# Patient Record
Sex: FEMALE | Race: WHITE | NOT HISPANIC OR LATINO
[De-identification: names, ages, dates, MRNs, and addresses within clinical notes are randomized per-mention and may not be internally consistent; named-entity substitution may affect disease eponyms.]

---

## 2017-01-10 ENCOUNTER — APPOINTMENT (OUTPATIENT)
Dept: INTERNAL MEDICINE | Facility: CLINIC | Age: 77
End: 2017-01-10

## 2020-02-05 ENCOUNTER — APPOINTMENT (OUTPATIENT)
Dept: ORTHOPEDIC SURGERY | Facility: CLINIC | Age: 80
End: 2020-02-05

## 2020-02-10 ENCOUNTER — APPOINTMENT (OUTPATIENT)
Dept: ORTHOPEDIC SURGERY | Facility: CLINIC | Age: 80
End: 2020-02-10
Payer: MEDICARE

## 2020-02-10 VITALS
DIASTOLIC BLOOD PRESSURE: 80 MMHG | SYSTOLIC BLOOD PRESSURE: 136 MMHG | HEIGHT: 60 IN | BODY MASS INDEX: 32.2 KG/M2 | WEIGHT: 164 LBS | HEART RATE: 82 BPM

## 2020-02-10 PROCEDURE — 99204 OFFICE O/P NEW MOD 45 MIN: CPT | Mod: 25

## 2020-02-10 PROCEDURE — 20610 DRAIN/INJ JOINT/BURSA W/O US: CPT | Mod: RT

## 2020-02-10 PROCEDURE — 73564 X-RAY EXAM KNEE 4 OR MORE: CPT | Mod: 50

## 2020-02-10 RX ORDER — METHYLPRED ACET/NACL,ISO-OS/PF 40 MG/ML
40 VIAL (ML) INJECTION
Qty: 1 | Refills: 0 | Status: COMPLETED | OUTPATIENT
Start: 2020-02-10

## 2020-02-10 RX ORDER — LIDOCAINE HYDROCHLORIDE 10 MG/ML
1 INJECTION, SOLUTION INFILTRATION; PERINEURAL
Refills: 0 | Status: COMPLETED | OUTPATIENT
Start: 2020-02-10

## 2020-02-10 RX ADMIN — METHYLPREDNISOLONE ACETATE 2 MG/ML: 40 INJECTION, SUSPENSION INTRALESIONAL; INTRAMUSCULAR; INTRASYNOVIAL; SOFT TISSUE at 00:00

## 2020-02-10 RX ADMIN — LIDOCAINE HYDROCHLORIDE 3 %: 10 INJECTION, SOLUTION INFILTRATION; PERINEURAL at 00:00

## 2020-03-02 ENCOUNTER — APPOINTMENT (OUTPATIENT)
Dept: ORTHOPEDIC SURGERY | Facility: CLINIC | Age: 80
End: 2020-03-02
Payer: MEDICARE

## 2020-03-02 VITALS — HEART RATE: 85 BPM | SYSTOLIC BLOOD PRESSURE: 131 MMHG | DIASTOLIC BLOOD PRESSURE: 79 MMHG

## 2020-03-02 PROCEDURE — 99214 OFFICE O/P EST MOD 30 MIN: CPT

## 2021-06-11 ENCOUNTER — NON-APPOINTMENT (OUTPATIENT)
Age: 81
End: 2021-06-11

## 2021-06-14 ENCOUNTER — APPOINTMENT (OUTPATIENT)
Dept: INTERNAL MEDICINE | Facility: CLINIC | Age: 81
End: 2021-06-14
Payer: MEDICARE

## 2021-06-14 VITALS
HEIGHT: 58.5 IN | WEIGHT: 172 LBS | BODY MASS INDEX: 35.14 KG/M2 | DIASTOLIC BLOOD PRESSURE: 80 MMHG | SYSTOLIC BLOOD PRESSURE: 162 MMHG | OXYGEN SATURATION: 96 % | HEART RATE: 85 BPM

## 2021-06-14 VITALS — SYSTOLIC BLOOD PRESSURE: 145 MMHG | DIASTOLIC BLOOD PRESSURE: 70 MMHG

## 2021-06-14 DIAGNOSIS — C44.90 UNSPECIFIED MALIGNANT NEOPLASM OF SKIN, UNSPECIFIED: ICD-10-CM

## 2021-06-14 DIAGNOSIS — I10 ESSENTIAL (PRIMARY) HYPERTENSION: ICD-10-CM

## 2021-06-14 PROCEDURE — G0442 ANNUAL ALCOHOL SCREEN 15 MIN: CPT

## 2021-06-14 PROCEDURE — G0439: CPT

## 2021-06-14 PROCEDURE — 99072 ADDL SUPL MATRL&STAF TM PHE: CPT

## 2021-06-14 PROCEDURE — G0444 DEPRESSION SCREEN ANNUAL: CPT | Mod: 59

## 2021-06-14 NOTE — REVIEW OF SYSTEMS
[Negative] : Heme/Lymph [Fever] : no fever [Chills] : no chills [Chest Pain] : no chest pain [Shortness Of Breath] : no shortness of breath [Abdominal Pain] : no abdominal pain [Joint Pain] : joint pain [Muscle Pain] : muscle pain [FreeTextEntry9] : right arm pain, knee pain

## 2021-06-14 NOTE — ASSESSMENT
[FreeTextEntry1] : 81F with HTN, GERD, hx of skin cancer, osteoarthritis of b/l knees, hx of herpes zoster infection and post herpetic neuralgia presents for visit to establish care with new provider. \par \par 1. HTN\par -not controlled but patient has not taken medicatoin\par -will check at home and RTC if uncontrolled\par \par 2. GERD\par -controlled with nexium\par \par 3. Skin Cancer\par -dermatology follow up - referral placed\par -counselled on sunscreen use\par \par 4. HCM\par -DEXA - referral given today\par -pneumovax - latasha remind next visit\par -shingrix - pt not wanting vaccines this visit\par -tdap  -discuss next visit\par -COVID vaccine - pfizer completed in april\par \par 5. Right tricep strain\par -normal strength and ROM in bilateral upper extremities\par -pain improved with diclofenac\par -PT referral\par \par rtc 3 months

## 2021-06-14 NOTE — HEALTH RISK ASSESSMENT
[Good] : ~his/her~  mood as  good [No] : In the past 12 months have you used drugs other than those required for medical reasons? No [None] : None [With Family] : lives with family [Fully functional (bathing, dressing, toileting, transferring, walking, feeding)] : Fully functional (bathing, dressing, toileting, transferring, walking, feeding) [Feels Safe at Home] : Feels safe at home [Fully functional (using the telephone, shopping, preparing meals, housekeeping, doing laundry, using] : Fully functional and needs no help or supervision to perform IADLs (using the telephone, shopping, preparing meals, housekeeping, doing laundry, using transportation, managing medications and managing finances) [Carbon Monoxide Detector] : carbon monoxide detector [Smoke Detector] : smoke detector [Seat Belt] :  uses seat belt [Sunscreen] : uses sunscreen [] : No [0] : 2) Feeling down, depressed, or hopeless: Not at all (0) [Audit-CScore] : 0 [de-identified] : exercising at home - yessica [de-identified] : reducing rice intake; loves vegetables [IOL3Iyqsy] : 0 [Patient declined mammogram] : Patient declined mammogram [Patient declined colonoscopy] : Patient declined colonoscopy [HIV Test offered] : HIV Test offered [Hepatitis C test offered] : Hepatitis C test offered [Retired] : retired [Sexually Active] : not sexually active [Reports changes in hearing] : Reports no changes in hearing [Reports changes in vision] : Reports no changes in vision [Reports changes in dental health] : Reports no changes in dental health [Guns at Home] : no guns at home [de-identified] : grandson, son, daughter in law [FreeTextEntry3] : reports not  for past 40 years

## 2021-06-14 NOTE — HISTORY OF PRESENT ILLNESS
[Ad Hoc ] : provided by an ad hoc  [FreeTextEntry1] : Visit to establish care with new primary care doctor\par  [FreeTextEntry4] : Aviva Buck  [FreeTextEntry3] : P [TWNoteComboBox1] : Polish [de-identified] : Pleasant 81F originally from [facility] with HTN, GERD, hx of skin cancer, osteoarthritis of b/l knees recommended for right TKR, hx of herpes zoster infection and post herpetic neuralgia presents for visit to establish care with new provider. She is accompanied by her grandson today and prefers that he perform Lithuanian translation - Heber AVINA Patient history/interview somewhat limited by pt enthusiastically answering questions before they are translated even after Heber and I try to phrase/pose questions. Pt just came back from [facility] with family, in a great mood\par \par Reviewed notes from Dr. Odom 5/5/12 and Dr. Arizmendi 3/2/20. \par \par Pt having right arm pain x 1 month, using hot/cold compress with no change in her symptoms. Pain worse at night. No trauma. \par \par Medications: lisinopril 5mg, amlodipine 5 mg, meloxicam 15 mg, Nexium, 40 mg, diclofenac 50 mg daily, gabapentin 300 mg daily - pt reporting diclofenac working better than meloxicam; stop meloxicam. Take nsaid with food, pt reports taking once a day or less\par Allergies: allergic to some kind of stomach medication; not sure.

## 2021-06-14 NOTE — PHYSICAL EXAM
[Normal Voice/Communication] : normal voice/communication [Normal TMs] : both tympanic membranes were normal [No Carotid Bruits] : no carotid bruits [No Abdominal Bruit] : a ~M bruit was not heard ~T in the abdomen [No Varicosities] : no varicosities [No Edema] : there was no peripheral edema [No Palpable Aorta] : no palpable aorta [No Extremity Clubbing/Cyanosis] : no extremity clubbing/cyanosis [Normal Appearance] : normal in appearance [No Masses] : no palpable masses [No Nipple Discharge] : no nipple discharge [No Axillary Lymphadenopathy] : no axillary lymphadenopathy [Normal Supraclavicular Nodes] : no supraclavicular lymphadenopathy [Coordination Grossly Intact] : coordination grossly intact [No Focal Deficits] : no focal deficits [Normal Gait] : normal gait [Normal] : affect was normal and insight and judgment were intact [de-identified] : mild pain on right side triceps flexion - normal strength of bilateral deltoid, triceps, bicep,  strength all 5/5

## 2021-06-15 LAB
ALBUMIN SERPL ELPH-MCNC: 4.7 G/DL
ALP BLD-CCNC: 92 U/L
ALT SERPL-CCNC: 13 U/L
ANION GAP SERPL CALC-SCNC: 12 MMOL/L
AST SERPL-CCNC: 19 U/L
BASOPHILS # BLD AUTO: 0.03 K/UL
BASOPHILS NFR BLD AUTO: 0.3 %
BILIRUB SERPL-MCNC: 0.4 MG/DL
BUN SERPL-MCNC: 15 MG/DL
CALCIUM SERPL-MCNC: 9.9 MG/DL
CHLORIDE SERPL-SCNC: 101 MMOL/L
CHOLEST SERPL-MCNC: 261 MG/DL
CO2 SERPL-SCNC: 28 MMOL/L
CREAT SERPL-MCNC: 0.8 MG/DL
EOSINOPHIL # BLD AUTO: 0.2 K/UL
EOSINOPHIL NFR BLD AUTO: 2.1 %
ESTIMATED AVERAGE GLUCOSE: 128 MG/DL
GLUCOSE SERPL-MCNC: 116 MG/DL
HBA1C MFR BLD HPLC: 6.1 %
HCT VFR BLD CALC: 44 %
HCV AB SER QL: NONREACTIVE
HCV S/CO RATIO: 0.16 S/CO
HDLC SERPL-MCNC: 99 MG/DL
HGB BLD-MCNC: 14.4 G/DL
HIV1+2 AB SPEC QL IA.RAPID: NONREACTIVE
IMM GRANULOCYTES NFR BLD AUTO: 0.3 %
LDLC SERPL CALC-MCNC: 140 MG/DL
LYMPHOCYTES # BLD AUTO: 2.36 K/UL
LYMPHOCYTES NFR BLD AUTO: 24.9 %
MAN DIFF?: NORMAL
MCHC RBC-ENTMCNC: 31.6 PG
MCHC RBC-ENTMCNC: 32.7 GM/DL
MCV RBC AUTO: 96.5 FL
MONOCYTES # BLD AUTO: 0.91 K/UL
MONOCYTES NFR BLD AUTO: 9.6 %
NEUTROPHILS # BLD AUTO: 5.94 K/UL
NEUTROPHILS NFR BLD AUTO: 62.8 %
NONHDLC SERPL-MCNC: 162 MG/DL
PLATELET # BLD AUTO: 209 K/UL
POTASSIUM SERPL-SCNC: 4.3 MMOL/L
PROT SERPL-MCNC: 7.4 G/DL
RBC # BLD: 4.56 M/UL
RBC # FLD: 13 %
SODIUM SERPL-SCNC: 141 MMOL/L
TRIGL SERPL-MCNC: 110 MG/DL
WBC # FLD AUTO: 9.47 K/UL

## 2021-08-16 ENCOUNTER — LABORATORY RESULT (OUTPATIENT)
Age: 81
End: 2021-08-16

## 2021-08-16 ENCOUNTER — APPOINTMENT (OUTPATIENT)
Dept: DERMATOLOGY | Facility: CLINIC | Age: 81
End: 2021-08-16
Payer: MEDICARE

## 2021-08-16 ENCOUNTER — APPOINTMENT (OUTPATIENT)
Dept: RADIOLOGY | Facility: CLINIC | Age: 81
End: 2021-08-16
Payer: MEDICARE

## 2021-08-16 ENCOUNTER — RESULT REVIEW (OUTPATIENT)
Age: 81
End: 2021-08-16

## 2021-08-16 VITALS — BODY MASS INDEX: 36.11 KG/M2 | WEIGHT: 172 LBS | HEIGHT: 58 IN

## 2021-08-16 DIAGNOSIS — L82.1 OTHER SEBORRHEIC KERATOSIS: ICD-10-CM

## 2021-08-16 PROCEDURE — 99202 OFFICE O/P NEW SF 15 MIN: CPT | Mod: 25

## 2021-08-16 PROCEDURE — 11102 TANGNTL BX SKIN SINGLE LES: CPT

## 2021-08-16 PROCEDURE — 77080 DXA BONE DENSITY AXIAL: CPT

## 2021-08-23 ENCOUNTER — NON-APPOINTMENT (OUTPATIENT)
Age: 81
End: 2021-08-23

## 2021-09-15 ENCOUNTER — APPOINTMENT (OUTPATIENT)
Dept: DERMATOLOGY | Facility: CLINIC | Age: 81
End: 2021-09-15

## 2021-09-27 ENCOUNTER — NON-APPOINTMENT (OUTPATIENT)
Age: 81
End: 2021-09-27

## 2021-10-06 ENCOUNTER — APPOINTMENT (OUTPATIENT)
Dept: INTERNAL MEDICINE | Facility: CLINIC | Age: 81
End: 2021-10-06

## 2021-12-22 ENCOUNTER — APPOINTMENT (OUTPATIENT)
Dept: INTERNAL MEDICINE | Facility: CLINIC | Age: 81
End: 2021-12-22

## 2022-02-10 ENCOUNTER — NON-APPOINTMENT (OUTPATIENT)
Age: 82
End: 2022-02-10

## 2022-04-11 ENCOUNTER — APPOINTMENT (OUTPATIENT)
Dept: INTERNAL MEDICINE | Facility: CLINIC | Age: 82
End: 2022-04-11

## 2022-04-18 ENCOUNTER — APPOINTMENT (OUTPATIENT)
Dept: INTERNAL MEDICINE | Facility: CLINIC | Age: 82
End: 2022-04-18
Payer: MEDICARE

## 2022-04-18 VITALS
WEIGHT: 172 LBS | HEART RATE: 74 BPM | OXYGEN SATURATION: 98 % | HEIGHT: 58 IN | DIASTOLIC BLOOD PRESSURE: 80 MMHG | BODY MASS INDEX: 36.11 KG/M2 | SYSTOLIC BLOOD PRESSURE: 160 MMHG

## 2022-04-18 DIAGNOSIS — B02.29 OTHER POSTHERPETIC NERVOUS SYSTEM INVOLVEMENT: ICD-10-CM

## 2022-04-18 PROCEDURE — 99214 OFFICE O/P EST MOD 30 MIN: CPT

## 2022-04-18 NOTE — HISTORY OF PRESENT ILLNESS
[Ad Hoc ] : provided by an ad hoc  [FreeTextEntry8] : 81F originally from [facility] with HTN, GERD, hx of skin cancer, osteoarthritis of b/l knees recommended for right TKR presents for walk in acute visit with complaints of :follow up of HTN\par Would like non generic lisinopril sent to pharmacy\par Does not check her blood pressures at home - does not have cuff to check with\par Would like nexium refilled\par Would like GI referral for endoscopy - has appt this week\par Would like all medications renewed\par Would like to discuss osteopenia results from DEXA [Family Member] : family member [Interpreters_FullName] : Heber [Interpreters_Relationshiptopatient] : Son [TWNoteComboBox1] : [place]

## 2022-04-18 NOTE — PHYSICAL EXAM
[Normal Voice/Communication] : normal voice/communication [No Respiratory Distress] : no respiratory distress  [Normal] : affect was normal and insight and judgment were intact

## 2022-05-25 ENCOUNTER — LABORATORY RESULT (OUTPATIENT)
Age: 82
End: 2022-05-25

## 2022-09-23 ENCOUNTER — MED ADMIN CHARGE (OUTPATIENT)
Age: 82
End: 2022-09-23

## 2022-09-23 ENCOUNTER — APPOINTMENT (OUTPATIENT)
Dept: INTERNAL MEDICINE | Facility: CLINIC | Age: 82
End: 2022-09-23

## 2022-09-23 VITALS
BODY MASS INDEX: 35.05 KG/M2 | WEIGHT: 167 LBS | SYSTOLIC BLOOD PRESSURE: 138 MMHG | HEIGHT: 58 IN | OXYGEN SATURATION: 98 % | DIASTOLIC BLOOD PRESSURE: 78 MMHG | HEART RATE: 88 BPM

## 2022-09-23 DIAGNOSIS — Z23 ENCOUNTER FOR IMMUNIZATION: ICD-10-CM

## 2022-09-23 DIAGNOSIS — S46.311S STRAIN OF MUSCLE, FASCIA AND TENDON OF TRICEPS, RIGHT ARM, SEQUELA: ICD-10-CM

## 2022-09-23 DIAGNOSIS — Z00.00 ENCOUNTER FOR GENERAL ADULT MEDICAL EXAMINATION W/OUT ABNORMAL FINDINGS: ICD-10-CM

## 2022-09-23 DIAGNOSIS — D48.5 NEOPLASM OF UNCERTAIN BEHAVIOR OF SKIN: ICD-10-CM

## 2022-09-23 DIAGNOSIS — K21.9 GASTRO-ESOPHAGEAL REFLUX DISEASE W/OUT ESOPHAGITIS: ICD-10-CM

## 2022-09-23 PROCEDURE — G0009: CPT

## 2022-09-23 PROCEDURE — G0444 DEPRESSION SCREEN ANNUAL: CPT | Mod: 59

## 2022-09-23 PROCEDURE — G0439: CPT

## 2022-09-23 PROCEDURE — 90677 PCV20 VACCINE IM: CPT

## 2022-09-23 PROCEDURE — 99212 OFFICE O/P EST SF 10 MIN: CPT | Mod: 25

## 2022-09-23 RX ORDER — BLOOD PRESSURE TEST KIT-LARGE
KIT MISCELLANEOUS
Qty: 1 | Refills: 0 | Status: ACTIVE | COMMUNITY
Start: 2022-04-18 | End: 1900-01-01

## 2022-09-23 RX ORDER — DICLOFENAC POTASSIUM 50 MG/1
50 TABLET, COATED ORAL DAILY
Qty: 90 | Refills: 3 | Status: COMPLETED | COMMUNITY
Start: 2021-06-14 | End: 2022-09-23

## 2022-09-23 NOTE — ASSESSMENT
[FreeTextEntry1] : 82F originally from [facility] with HTN, GERD, hx of skin cancer, osteoarthritis of b/l knees recommended for right TKR presents for CPE with complaints of GERD not controlled by nexium\par \par 1. HTN\par -amlodipine 5 mg daily \par -lisinopril 5 mg KIRSTIE \par \par 2. GERD\par -on nexium; pt to f/u with GI for endoscopy. referral given again\par Counselled patient on avoiding high acidity foods such as orange, lemon, grapefruit and other citrus fruits/juices, spicy foods, tomato based foods, alcohol and caffeine. Advised patient to drink plenty of water and not to lay down after eating.\par Counselled that medication should be taken first thing in AM 30-60 minutes before any meals with water only.\par \par 3. Skin Cancer\par -dermatology follow up - referral placed last visit, renewed today and discussed importance of follow up\par -counselled on sunscreen use discussed last visit. \par \par 4. HCM\par -DEXA - osteopenia 08/2021, c/w Ca/Vit D supplement\par -pneumonia vaccine : prevnar 20 9/23/22\par -shingrix - declined \par -tdap - declined \par -COVID vaccine - pfizer completed in april\par -flu vaccine - declined \par

## 2022-09-23 NOTE — PHYSICAL EXAM
[Normal TMs] : both tympanic membranes were normal [No Edema] : there was no peripheral edema [Soft] : abdomen soft [Non Tender] : non-tender [Non-distended] : non-distended [No Masses] : no abdominal mass palpated [Normal Supraclavicular Nodes] : no supraclavicular lymphadenopathy [Coordination Grossly Intact] : coordination grossly intact [Normal] : affect was normal and insight and judgment were intact [de-identified] : multiple skin lesions on face, scalp, back

## 2022-09-23 NOTE — HISTORY OF PRESENT ILLNESS
[Family Member] : family member [FreeTextEntry1] : Patient presents today for annual physical exam\par  [de-identified] : 82F originally from [facility] with HTN, GERD, hx of skin cancer, osteoarthritis of b/l knees recommended for right TKR presents for CPE\par \par Pt reports GERD is bothersome. Taking Nexium in AM however taking as needed. She is not taking it daily. She drinks coffee daily, sometimes before bed. Her son states she eats a large meal at night then lays down. She reports famotidine does not work for her. Pt reports she is taking meloxicam, avoiding advil. Knows advil/meloxicam bad for HTN/kidneys. \par \par Patient states she is very tired some days and she feels very cold. It resolves in 30 minutes\par \par They did not  the BP machine after script sent last April\par \par No derm visit since she was last here either. \par \par No GI visit since last visit either

## 2022-09-23 NOTE — INTERPRETER SERVICES
[Patient Declined  Services] : - None: Patient declined  services [Interpreters_Relationshiptopatient] : Son [TWNoteComboBox1] : Cambodian

## 2022-09-23 NOTE — HEALTH RISK ASSESSMENT
[Patient declined mammogram] : Patient declined mammogram [Patient declined colonoscopy] : Patient declined colonoscopy [HIV Test offered] : HIV Test offered [Hepatitis C test offered] : Hepatitis C test offered [None] : None [With Family] : lives with family [Retired] : retired [Feels Safe at Home] : Feels safe at home [Fully functional (bathing, dressing, toileting, transferring, walking, feeding)] : Fully functional (bathing, dressing, toileting, transferring, walking, feeding) [Fully functional (using the telephone, shopping, preparing meals, housekeeping, doing laundry, using] : Fully functional and needs no help or supervision to perform IADLs (using the telephone, shopping, preparing meals, housekeeping, doing laundry, using transportation, managing medications and managing finances) [Smoke Detector] : smoke detector [Carbon Monoxide Detector] : carbon monoxide detector [Seat Belt] :  uses seat belt [Sunscreen] : uses sunscreen [Good] : ~his/her~  mood as  good [Never] : Never [No] : In the past 12 months have you used drugs other than those required for medical reasons? No [0] : 2) Feeling down, depressed, or hopeless: Not at all (0) [PHQ-2 Negative - No further assessment needed] : PHQ-2 Negative - No further assessment needed [Audit-CScore] : 0 [de-identified] : reports exercising regularly  [de-identified] : eating healthy but larger proportions. [[id number]] : 0 [Sexually Active] : not sexually active [Reports changes in hearing] : Reports no changes in hearing [Reports changes in vision] : Reports no changes in vision [Reports changes in dental health] : Reports no changes in dental health [Guns at Home] : no guns at home [de-identified] : grandson, son, daughter in law [FreeTextEntry3] : reports not  for past 40 years

## 2022-09-26 LAB
ALBUMIN SERPL ELPH-MCNC: 4.9 G/DL
ALP BLD-CCNC: 92 U/L
ALT SERPL-CCNC: 13 U/L
ANION GAP SERPL CALC-SCNC: 13 MMOL/L
AST SERPL-CCNC: 22 U/L
BASOPHILS # BLD AUTO: 0.04 K/UL
BASOPHILS NFR BLD AUTO: 0.5 %
BILIRUB SERPL-MCNC: 0.3 MG/DL
BUN SERPL-MCNC: 21 MG/DL
CALCIUM SERPL-MCNC: 9.8 MG/DL
CHLORIDE SERPL-SCNC: 104 MMOL/L
CHOLEST SERPL-MCNC: 258 MG/DL
CO2 SERPL-SCNC: 25 MMOL/L
CREAT SERPL-MCNC: 0.8 MG/DL
EGFR: 74 ML/MIN/1.73M2
EOSINOPHIL # BLD AUTO: 0.09 K/UL
EOSINOPHIL NFR BLD AUTO: 1.1 %
ESTIMATED AVERAGE GLUCOSE: 134 MG/DL
GLUCOSE SERPL-MCNC: 107 MG/DL
HBA1C MFR BLD HPLC: 6.3 %
HCT VFR BLD CALC: 45.5 %
HDLC SERPL-MCNC: 105 MG/DL
HGB BLD-MCNC: 15 G/DL
IMM GRANULOCYTES NFR BLD AUTO: 0.4 %
LDLC SERPL CALC-MCNC: 134 MG/DL
LYMPHOCYTES # BLD AUTO: 2.2 K/UL
LYMPHOCYTES NFR BLD AUTO: 27.4 %
MAN DIFF?: NORMAL
MCHC RBC-ENTMCNC: 31.4 PG
MCHC RBC-ENTMCNC: 33 GM/DL
MCV RBC AUTO: 95.2 FL
MONOCYTES # BLD AUTO: 0.73 K/UL
MONOCYTES NFR BLD AUTO: 9.1 %
NEUTROPHILS # BLD AUTO: 4.94 K/UL
NEUTROPHILS NFR BLD AUTO: 61.5 %
NONHDLC SERPL-MCNC: 153 MG/DL
PLATELET # BLD AUTO: 196 K/UL
POTASSIUM SERPL-SCNC: 4.3 MMOL/L
PROT SERPL-MCNC: 7.4 G/DL
RBC # BLD: 4.78 M/UL
RBC # FLD: 13.3 %
SODIUM SERPL-SCNC: 142 MMOL/L
TRIGL SERPL-MCNC: 95 MG/DL
WBC # FLD AUTO: 8.03 K/UL

## 2023-06-20 ENCOUNTER — APPOINTMENT (OUTPATIENT)
Dept: ORTHOPEDIC SURGERY | Facility: CLINIC | Age: 83
End: 2023-06-20

## 2023-06-20 ENCOUNTER — APPOINTMENT (OUTPATIENT)
Dept: INTERNAL MEDICINE | Facility: CLINIC | Age: 83
End: 2023-06-20
Payer: MEDICARE

## 2023-06-20 VITALS
HEART RATE: 81 BPM | SYSTOLIC BLOOD PRESSURE: 134 MMHG | DIASTOLIC BLOOD PRESSURE: 70 MMHG | OXYGEN SATURATION: 98 % | BODY MASS INDEX: 35.05 KG/M2 | WEIGHT: 167 LBS | HEIGHT: 58 IN

## 2023-06-20 DIAGNOSIS — R73.03 PREDIABETES.: ICD-10-CM

## 2023-06-20 DIAGNOSIS — R29.898 OTHER SYMPTOMS AND SIGNS INVOLVING THE MUSCULOSKELETAL SYSTEM: ICD-10-CM

## 2023-06-20 DIAGNOSIS — E66.9 OBESITY, UNSPECIFIED: ICD-10-CM

## 2023-06-20 PROCEDURE — 99214 OFFICE O/P EST MOD 30 MIN: CPT

## 2023-06-23 PROBLEM — R29.898 LEG HEAVINESS: Status: ACTIVE | Noted: 2023-06-23

## 2023-06-23 NOTE — HISTORY OF PRESENT ILLNESS
[Family Member] : family member [FreeTextEntry1] : Patient of Dr. Torres, initial visit with this provider [de-identified] : 83 yo F with h/o HTN, PreDM, GERD, obesity, non-melanoma skin Ca, OA knees bilat (advised to have TKR)\par \par Here with concern about circulation in her legs.  Reports she has been experiencing leg heaviness for the last 6 months or so.  Occurs when she has been walking for a short while, then she feels the leg discomfort and heaviness.  Gets better with rest.  She would like to see a vascular specialist.  Has bilateral knee pain, and was advised to have a knee replacement - but says what she has recently been experiencing is different from her knee pain.\par RE Derm: knows she has skin Ca (BCC) but does not want any more surgeries so does not want to go back,

## 2023-06-23 NOTE — INTERPRETER SERVICES
[Patient Declined  Services] : - None: Patient declined  services [Interpreters_Relationshiptopatient] : Adult son with her at the visit and she prefers he translate

## 2023-06-23 NOTE — ASSESSMENT
[FreeTextEntry1] : 81 yo F with h/o HTN, PreDM, GERD, obesity, non-melanoma skin Ca, OA knees bilat (advised to have TKR)\par \par RE leg heaviness: Reports she has been experiencing leg heaviness for the last 6 months or so.  Occurs when she has been walking for a short while, then she feels the leg discomfort and heaviness.  Gets better with rest.  She would like to see a vascular specialist.  Has bilateral knee pain, and was advised to have a knee replacement - but says what she has recently been experiencing is different from her knee pain.\par Referring to vascular. \par \par RE Derm: knows she has skin Ca (BCC) but does not want any more surgeries - declines to go back\par \par RE HTN: In target range on current meds.  Due for labs.\par \par Re: Prediabetes: Currently being controlled with diet, exercise, lifestyle modifications.  Due to recheck labs today.\par \par RTC GERD: On PPI.  Follows with GI.\par \par Medication reconciliation performed during today's visit.

## 2023-06-23 NOTE — PHYSICAL EXAM
[No Acute Distress] : no acute distress [Normal Sclera/Conjunctiva] : normal sclera/conjunctiva [No Respiratory Distress] : no respiratory distress  [Normal Rate] : normal rate  [Clear to Auscultation] : lungs were clear to auscultation bilaterally [Regular Rhythm] : with a regular rhythm [Pedal Pulses Present] : the pedal pulses are present [No Edema] : there was no peripheral edema [Non Tender] : non-tender [No Spinal Tenderness] : no spinal tenderness [Grossly Normal Strength/Tone] : grossly normal strength/tone

## 2023-06-27 ENCOUNTER — LABORATORY RESULT (OUTPATIENT)
Age: 83
End: 2023-06-27

## 2023-07-02 LAB
ALBUMIN SERPL ELPH-MCNC: 4.3 G/DL
ALP BLD-CCNC: 100 U/L
ALT SERPL-CCNC: 12 U/L
ANION GAP SERPL CALC-SCNC: 11 MMOL/L
AST SERPL-CCNC: 23 U/L
BILIRUB SERPL-MCNC: 0.2 MG/DL
BUN SERPL-MCNC: 14 MG/DL
CALCIUM SERPL-MCNC: 9.3 MG/DL
CHLORIDE SERPL-SCNC: 105 MMOL/L
CHOLEST SERPL-MCNC: 230 MG/DL
CO2 SERPL-SCNC: 28 MMOL/L
CREAT SERPL-MCNC: 0.81 MG/DL
EGFR: 72 ML/MIN/1.73M2
ESTIMATED AVERAGE GLUCOSE: 131 MG/DL
GLUCOSE SERPL-MCNC: 98 MG/DL
HBA1C MFR BLD HPLC: 6.2 %
HCT VFR BLD CALC: 42.2 %
HDLC SERPL-MCNC: 84 MG/DL
HGB BLD-MCNC: 13.3 G/DL
LDLC SERPL CALC-MCNC: 125 MG/DL
MCHC RBC-ENTMCNC: 31.1 PG
MCHC RBC-ENTMCNC: 31.5 GM/DL
MCV RBC AUTO: 98.8 FL
NONHDLC SERPL-MCNC: 146 MG/DL
PLATELET # BLD AUTO: 190 K/UL
POTASSIUM SERPL-SCNC: 4.6 MMOL/L
PROT SERPL-MCNC: 6.8 G/DL
RBC # BLD: 4.27 M/UL
RBC # FLD: 14.1 %
SODIUM SERPL-SCNC: 144 MMOL/L
TRIGL SERPL-MCNC: 105 MG/DL
TSH SERPL-ACNC: 4.42 UIU/ML
WBC # FLD AUTO: 8.14 K/UL

## 2023-07-05 ENCOUNTER — NON-APPOINTMENT (OUTPATIENT)
Age: 83
End: 2023-07-05

## 2023-07-05 ENCOUNTER — APPOINTMENT (OUTPATIENT)
Dept: INTERNAL MEDICINE | Facility: CLINIC | Age: 83
End: 2023-07-05
Payer: MEDICARE

## 2023-07-05 ENCOUNTER — TRANSCRIPTION ENCOUNTER (OUTPATIENT)
Age: 83
End: 2023-07-05

## 2023-07-05 VITALS
WEIGHT: 165 LBS | SYSTOLIC BLOOD PRESSURE: 130 MMHG | HEART RATE: 81 BPM | OXYGEN SATURATION: 98 % | BODY MASS INDEX: 34.63 KG/M2 | DIASTOLIC BLOOD PRESSURE: 78 MMHG | HEIGHT: 58 IN

## 2023-07-05 DIAGNOSIS — I10 ESSENTIAL (PRIMARY) HYPERTENSION: ICD-10-CM

## 2023-07-05 DIAGNOSIS — M17.12 UNILATERAL PRIMARY OSTEOARTHRITIS, LEFT KNEE: ICD-10-CM

## 2023-07-05 DIAGNOSIS — R00.2 PALPITATIONS: ICD-10-CM

## 2023-07-05 DIAGNOSIS — M17.11 UNILATERAL PRIMARY OSTEOARTHRITIS, RIGHT KNEE: ICD-10-CM

## 2023-07-05 DIAGNOSIS — E55.9 VITAMIN D DEFICIENCY, UNSPECIFIED: ICD-10-CM

## 2023-07-05 PROCEDURE — 93000 ELECTROCARDIOGRAM COMPLETE: CPT

## 2023-07-05 PROCEDURE — 99214 OFFICE O/P EST MOD 30 MIN: CPT | Mod: 25

## 2023-07-05 RX ORDER — AMLODIPINE BESYLATE 5 MG/1
5 TABLET ORAL
Qty: 90 | Refills: 3 | Status: ACTIVE | COMMUNITY
Start: 2021-06-14 | End: 1900-01-01

## 2023-07-05 RX ORDER — LISINOPRIL 5 MG/1
5 TABLET ORAL DAILY
Qty: 90 | Refills: 3 | Status: ACTIVE | COMMUNITY
Start: 2021-06-14 | End: 1900-01-01

## 2023-07-05 RX ORDER — UBIDECARENONE/VIT E ACET 100MG-5
50 MCG CAPSULE ORAL
Qty: 90 | Refills: 3 | Status: ACTIVE | COMMUNITY
Start: 2023-07-05 | End: 1900-01-01

## 2023-07-10 PROBLEM — R00.2 PALPITATIONS: Status: ACTIVE | Noted: 2023-07-10

## 2023-07-10 PROBLEM — M17.12 PRIMARY OSTEOARTHRITIS OF LEFT KNEE: Status: ACTIVE | Noted: 2020-02-10

## 2023-07-10 NOTE — INTERPRETER SERVICES
[Patient Declined  Services] : - None: Patient declined  services [TWNoteComboBox1] : Sierra Leonean [Interpreters_Relationshiptopatient] : son

## 2023-07-10 NOTE — ASSESSMENT
[FreeTextEntry1] : GAYLE RODRIGUEZ  is a 83 year old female   who is  originally from [facility] with hx of  preDM, obesity, HTN, GERD, skin cancer, Osteoarthritis of b/l knees presented today for palpitation.\par \par # palpitation\par ECG today: NSR 73bpm. Reassured pt. \par /78 acceptable.\par Recommended to cut down caffein intake and stress management. \par Pt needs refill for amlodipine 5mg qd and lisinopril 5mg qd. Sent Rx. \par \par # GERD, hiatal hernia\par Cut down fatty, spicy food for GI [place]\par hx of hiatal hernia: nexium 40mg qd. prn pepcid. \par To f/up GI\par \par # b/l knee joint pain\par was recommended knee replacement for pain management but postponed due to COVID pandemic.\par Sent rx for mobic 7.5mg po bid for 1 week. \par Explained that I cannot sent mobic for 90 days doses because of the increased risk of GI bleeding, peptic ulcer with long term use of strong NSAID.\par Recommended Tylenol 500mg po tid and home stretching and ambulation. \par f/up with ortho\par \par RTO in 4months or prn.

## 2023-07-10 NOTE — REVIEW OF SYSTEMS
[FreeTextEntry2] : Constitutional:  no fever and no chills. \par Eyes:  no discharge. \par HEENT:  no earache. \par Cardiovascular:  no chest pain, no palpitations and no lower extremity edema. \par Respiratory:  no shortness of breath, no wheezing and no cough. \par Gastrointestinal:  no abdominal pain, no nausea and no vomiting. \par Genitourinary:  no dysuria. \par Musculoskeletal:  no joint pain. \par Integumentary:  no itching. \par Neurological:  no headache. \par Psychiatric:  not suicidal. \par Hematologic/Lymphatic:  no easy bleeding. [FreeTextEntry5] : see hpi [FreeTextEntry7] : see hpi [FreeTextEntry9] : see hpi

## 2023-07-10 NOTE — PHYSICAL EXAM
[Non Tender] : non-tender [Normal Bowel Sounds] : normal bowel sounds [de-identified] : obesely distended [de-identified] : WDWN in NAD\par HEENT:  unremarkable\par Neck:  supple, no JVD, no LN\par Lungs:  CTA B/L, no W/R/R\par Heart:  Reg rate, +S1S2, no M/R/G\par Abdomen:  soft, NT, ND, +BS, no masses, no HS-megaly\par Genital: No pubic or genital lesions noted.\par Ext:  no C/C/E\par Neuro:  no focal deficits [de-identified] : b/l knee joint swelling, limited ROM due to knee pain

## 2023-07-10 NOTE — HISTORY OF PRESENT ILLNESS
[FreeTextEntry1] : f/up [de-identified] : GAYLE RODRIGUEZ  is a 83 year old female   who is  originally from [facility] with hx of  preDM, obesity, HTN, GERD, skin cancer, Osteoarthritis of b/l knees presented today for palpitation. Son came together and pt wishes to share information.\par \par About 1 month ago, she had some argument over the phone and stressed out. At the night, she woke up from sleep due to palpation and lots of sweating  for about 3 min. Sx subsided on its own. Pt denies of current CP, SOB. She loves Kiswahili food and 2 cups of light coffee daily. She occasionally had leg cramping. \par \par Rt knee TKA was planned but put on hold due to COVID pandemic.  Pt is asking for Mobic 15mg po qd for 90 days dose. Denied fever, chills,CP, SOB, abdominal pain, n/v/c/d.

## 2023-07-18 ENCOUNTER — APPOINTMENT (OUTPATIENT)
Dept: VASCULAR SURGERY | Facility: CLINIC | Age: 83
End: 2023-07-18
Payer: MEDICARE

## 2023-07-18 PROCEDURE — 99204 OFFICE O/P NEW MOD 45 MIN: CPT

## 2023-07-18 PROCEDURE — 93970 EXTREMITY STUDY: CPT

## 2023-07-18 NOTE — ASSESSMENT
[FreeTextEntry1] : In the office today patient underwent a venous duplex study which shows no evidence of DVT.  There is scattered mild reflux throughout both lower extremities.  Patient also has bilateral Baker cysts.  At this time there is no need for intervention.  Through  patient was instructed to elevate her legs and continue ambulating.  She will see consultation for knee replacement.

## 2023-07-18 NOTE — PHYSICAL EXAM
[JVD] : no jugular venous distention  [Normal Breath Sounds] : Normal breath sounds [Normal Rate and Rhythm] : normal rate and rhythm [2+] : left 2+ [Ankle Swelling (On Exam)] : not present [Varicose Veins Of Lower Extremities] : bilaterally [Ankle Swelling On The Right] : mild [] : not present [Abdomen Tenderness] : ~T ~M No abdominal tenderness [No Rash or Lesion] : No rash or lesion [Alert] : alert [Calm] : calm [de-identified] : Appears well

## 2023-07-18 NOTE — HISTORY OF PRESENT ILLNESS
[FreeTextEntry1] : 83-year-old female with a history of hypertension, prediabetes and bilateral arthritis presents to the office complaining of stiffness in both lower extremities.  The through a  patient states that she has heaviness and cramps at night.  Patient was planned to have a right total knee replacement.  She presents for evaluation

## 2023-08-28 RX ORDER — MELOXICAM 7.5 MG/1
7.5 TABLET ORAL
Qty: 14 | Refills: 1 | Status: ACTIVE | COMMUNITY
Start: 2023-07-05 | End: 1900-01-01

## 2024-01-12 RX ORDER — FAMOTIDINE 20 MG/1
20 TABLET, FILM COATED ORAL
Qty: 30 | Refills: 1 | Status: ACTIVE | COMMUNITY
Start: 2023-07-05 | End: 1900-01-01

## 2024-07-24 ENCOUNTER — NON-APPOINTMENT (OUTPATIENT)
Age: 84
End: 2024-07-24

## 2024-07-24 ENCOUNTER — APPOINTMENT (OUTPATIENT)
Dept: INTERNAL MEDICINE | Facility: CLINIC | Age: 84
End: 2024-07-24
Payer: MEDICARE

## 2024-07-24 ENCOUNTER — RESULT REVIEW (OUTPATIENT)
Age: 84
End: 2024-07-24

## 2024-07-24 VITALS
HEIGHT: 58.25 IN | SYSTOLIC BLOOD PRESSURE: 162 MMHG | BODY MASS INDEX: 35.2 KG/M2 | OXYGEN SATURATION: 98 % | HEART RATE: 76 BPM | WEIGHT: 170 LBS | DIASTOLIC BLOOD PRESSURE: 82 MMHG

## 2024-07-24 VITALS — SYSTOLIC BLOOD PRESSURE: 140 MMHG | DIASTOLIC BLOOD PRESSURE: 85 MMHG

## 2024-07-24 DIAGNOSIS — E66.9 OBESITY, UNSPECIFIED: ICD-10-CM

## 2024-07-24 DIAGNOSIS — Z00.00 ENCOUNTER FOR GENERAL ADULT MEDICAL EXAMINATION W/OUT ABNORMAL FINDINGS: ICD-10-CM

## 2024-07-24 DIAGNOSIS — E55.9 VITAMIN D DEFICIENCY, UNSPECIFIED: ICD-10-CM

## 2024-07-24 DIAGNOSIS — K44.9 DIAPHRAGMATIC HERNIA W/OUT OBSTRUCTION OR GANGRENE: ICD-10-CM

## 2024-07-24 DIAGNOSIS — Z13.39 ENCOUNTER FOR SCREENING EXAM FOR OTHER MENTAL HEALTH AND BEHAVIORAL DISORDERS: ICD-10-CM

## 2024-07-24 DIAGNOSIS — Z13.31 ENCOUNTER FOR SCREENING FOR DEPRESSION: ICD-10-CM

## 2024-07-24 DIAGNOSIS — C44.90 UNSPECIFIED MALIGNANT NEOPLASM OF SKIN, UNSPECIFIED: ICD-10-CM

## 2024-07-24 DIAGNOSIS — B02.29 OTHER POSTHERPETIC NERVOUS SYSTEM INVOLVEMENT: ICD-10-CM

## 2024-07-24 DIAGNOSIS — I10 ESSENTIAL (PRIMARY) HYPERTENSION: ICD-10-CM

## 2024-07-24 DIAGNOSIS — R29.898 OTHER SYMPTOMS AND SIGNS INVOLVING THE MUSCULOSKELETAL SYSTEM: ICD-10-CM

## 2024-07-24 DIAGNOSIS — R00.2 PALPITATIONS: ICD-10-CM

## 2024-07-24 DIAGNOSIS — M85.80 OTHER SPECIFIED DISORDERS OF BONE DENSITY AND STRUCTURE, UNSPECIFIED SITE: ICD-10-CM

## 2024-07-24 PROCEDURE — G0444 DEPRESSION SCREEN ANNUAL: CPT | Mod: 59

## 2024-07-24 PROCEDURE — G0442 ANNUAL ALCOHOL SCREEN 15 MIN: CPT | Mod: 59

## 2024-07-24 PROCEDURE — 93010 ELECTROCARDIOGRAM REPORT: CPT

## 2024-07-24 PROCEDURE — 99397 PER PM REEVAL EST PAT 65+ YR: CPT

## 2024-07-25 ENCOUNTER — APPOINTMENT (OUTPATIENT)
Dept: RADIOLOGY | Facility: CLINIC | Age: 84
End: 2024-07-25
Payer: MEDICARE

## 2024-07-25 PROCEDURE — 77080 DXA BONE DENSITY AXIAL: CPT

## 2024-07-28 NOTE — HEALTH RISK ASSESSMENT
[Good] : ~his/her~  mood as  good [No] : In the past 12 months have you used drugs other than those required for medical reasons? No [0] : 2) Feeling down, depressed, or hopeless: Not at all (0) [PHQ-2 Negative - No further assessment needed] : PHQ-2 Negative - No further assessment needed [Patient declined mammogram] : Patient declined mammogram [Patient declined colonoscopy] : Patient declined colonoscopy [HIV Test offered] : HIV Test offered [Hepatitis C test offered] : Hepatitis C test offered [None] : None [With Family] : lives with family [Retired] : retired [Feels Safe at Home] : Feels safe at home [Fully functional (bathing, dressing, toileting, transferring, walking, feeding)] : Fully functional (bathing, dressing, toileting, transferring, walking, feeding) [Fully functional (using the telephone, shopping, preparing meals, housekeeping, doing laundry, using] : Fully functional and needs no help or supervision to perform IADLs (using the telephone, shopping, preparing meals, housekeeping, doing laundry, using transportation, managing medications and managing finances) [Smoke Detector] : smoke detector [Carbon Monoxide Detector] : carbon monoxide detector [Seat Belt] :  uses seat belt [Sunscreen] : uses sunscreen [No falls in past year] : Patient reported no falls in the past year [de-identified] : see hpi [Audit-CScore] : 0 [de-identified] : reports exercising regularly  [[id number]] : 0 [de-identified] : eating healthy but larger proportions. [Never] : Never [No Retinopathy] : No retinopathy [EyeExamDate] : 07/24 [Patient reported bone density results were abnormal] : Patient reported bone density results were abnormal [Change in mental status noted] : No change in mental status noted [Language] : denies difficulty with language [Single] : single [Sexually Active] : not sexually active [Reports changes in hearing] : Reports no changes in hearing [Reports changes in vision] : Reports no changes in vision [Reports changes in dental health] : Reports no changes in dental health [BoneDensityDate] : 08/21 [de-identified] : grandson, son, daughter in law [FreeTextEntry3] : reports not  for past 40 years [With Patient/Caregiver] : , with patient/caregiver [AdvancecareDate] : 07/24

## 2024-07-28 NOTE — PHYSICAL EXAM
[Normal TMs] : both tympanic membranes were normal [No Edema] : there was no peripheral edema [Soft] : abdomen soft [Non Tender] : non-tender [Non-distended] : non-distended [No Masses] : no abdominal mass palpated [Normal Supraclavicular Nodes] : no supraclavicular lymphadenopathy [Coordination Grossly Intact] : coordination grossly intact [Normal] : affect was normal and insight and judgment were intact [de-identified] : left leg Jose sign negative. b/l LE mild varicose veins.  [de-identified] : multiple skin lesions on face, scalp, back

## 2024-07-28 NOTE — HISTORY OF PRESENT ILLNESS
[Family Member] : family member [FreeTextEntry1] : Patient presents today for annual physical exam\par   [de-identified] : Ms. GAYLE RODRIGUEZ is a 84 year old White  female  with history of  HTN, GERD, hx of skin cancer, osteoarthritis of b/l knees recommended for right TKR but refused presented today for comprehensive evaluation. Last seen by me 7/5/23.  She is originally from [facility] and reports stable conditions. She is single and keeps self busy in house making.  She came here with son and wishes to share information and use him as .  Pt reports GERD is bothersome with known hiatal hernia. Taking Nexium in AM however taking as needed. She is not taking it daily. She drinks coffee daily, sometimes before bed. Her son states she eats a large meal at night then lays down. She reports famotidine does not work for her. Pt reports she is taking meloxicam, avoiding advil. Knows advil/meloxicam bad for HTN/kidneys.  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 Son worried about mom's forehead elevated mole and Rt upper extremity irregular shape mole.  -Exercise: walks a lot. -Diet: cooks for self and eats regular diet. Denied fever, chills,CP, SOB, abdominal pain, n/v/c/d.

## 2024-07-28 NOTE — INTERPRETER SERVICES
[Patient Declined  Services] : - None: Patient declined  services [Interpreters_Relationshiptopatient] : Son [TWNoteComboBox1] : Jamaican

## 2024-07-28 NOTE — HISTORY OF PRESENT ILLNESS
[Family Member] : family member [FreeTextEntry1] : Patient presents today for annual physical exam\par   [de-identified] : Ms. GAYLE RODRIGUEZ is a 84 year old White  female  with history of  HTN, GERD, hx of skin cancer, osteoarthritis of b/l knees recommended for right TKR but refused presented today for comprehensive evaluation. Last seen by me 7/5/23.  She is originally from [facility] and reports stable conditions. She is single and keeps self busy in house making.  She came here with son and wishes to share information and use him as .  Pt reports GERD is bothersome with known hiatal hernia. Taking Nexium in AM however taking as needed. She is not taking it daily. She drinks coffee daily, sometimes before bed. Her son states she eats a large meal at night then lays down. She reports famotidine does not work for her. Pt reports she is taking meloxicam, avoiding advil. Knows advil/meloxicam bad for HTN/kidneys.  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 Son worried about mom's forehead elevated mole and Rt upper extremity irregular shape mole.  -Exercise: walks a lot. -Diet: cooks for self and eats regular diet. Denied fever, chills,CP, SOB, abdominal pain, n/v/c/d.

## 2024-07-28 NOTE — HEALTH RISK ASSESSMENT
[Good] : ~his/her~  mood as  good [No] : In the past 12 months have you used drugs other than those required for medical reasons? No [0] : 2) Feeling down, depressed, or hopeless: Not at all (0) [PHQ-2 Negative - No further assessment needed] : PHQ-2 Negative - No further assessment needed [Patient declined mammogram] : Patient declined mammogram [Patient declined colonoscopy] : Patient declined colonoscopy [HIV Test offered] : HIV Test offered [Hepatitis C test offered] : Hepatitis C test offered [None] : None [With Family] : lives with family [Retired] : retired [Feels Safe at Home] : Feels safe at home [Fully functional (bathing, dressing, toileting, transferring, walking, feeding)] : Fully functional (bathing, dressing, toileting, transferring, walking, feeding) [Fully functional (using the telephone, shopping, preparing meals, housekeeping, doing laundry, using] : Fully functional and needs no help or supervision to perform IADLs (using the telephone, shopping, preparing meals, housekeeping, doing laundry, using transportation, managing medications and managing finances) [Smoke Detector] : smoke detector [Carbon Monoxide Detector] : carbon monoxide detector [Seat Belt] :  uses seat belt [Sunscreen] : uses sunscreen [No falls in past year] : Patient reported no falls in the past year [de-identified] : see hpi [Audit-CScore] : 0 [de-identified] : reports exercising regularly  [[id number]] : 0 [de-identified] : eating healthy but larger proportions. [Never] : Never [No Retinopathy] : No retinopathy [EyeExamDate] : 07/24 [Patient reported bone density results were abnormal] : Patient reported bone density results were abnormal [Change in mental status noted] : No change in mental status noted [Language] : denies difficulty with language [Single] : single [Sexually Active] : not sexually active [Reports changes in hearing] : Reports no changes in hearing [Reports changes in vision] : Reports no changes in vision [Reports changes in dental health] : Reports no changes in dental health [BoneDensityDate] : 08/21 [de-identified] : grandson, son, daughter in law [FreeTextEntry3] : reports not  for past 40 years [With Patient/Caregiver] : , with patient/caregiver [AdvancecareDate] : 07/24

## 2024-07-28 NOTE — ASSESSMENT
[FreeTextEntry1] : Ms. GAYLE RODRIGUEZ is a 84 year old White  female  with history of  HTN, GERD, hx of skin cancer, osteoarthritis of b/l knees recommended for right TKR but refused presented today for comprehensive evaluation.  # HCM Pt dose not wish to discuss about vaccines today( Declined Flu, Tdap, shingles in the past). -Pneumonia shot: PCV20 9/23/22 utd -Retinal exam yearly: saw ophthalmology last week, mild cataract, wearing eyeglasses, denies retinopathy -mammo/ US breast: N/A. passes surveillance age. -pap smear: N/A. passes surveillance age. -bone density: 8/16/22 Osteopenia, Left foot fracture 8 years ago, No recent fracture, Rx made today. c/w Ca/Vit D supplement -colonoscopy: N/A. passes surveillance age. -PHQ2: negative -SBIRT: negative.   1. HTN, palpitation -ECG today for c/o palpitation: NSR 65bpm -BP was elevated to 162/82 then decreased to 140/85 at the end of visit.  Been On amlodipine 5 mg daily and lisinopril 5 mg qd.  Today Increased Lisinopril to 10mg po qd.  Discussed on Low salt diet and lose weight ( current BMI 35.22) to manage HTN. Printed out Rx for BP cuff, should check home BP and return with BP cuff and BP log.  2. GERD -on nexium for a long years;  Hx of hiatal hernia and chronic GERD.  Made referral to GI Isac Guzman as pt's request. Counselled patient on avoiding high acidity foods such as orange, lemon, grapefruit and other citrus fruits/juices, spicy foods, tomato based foods, alcohol and caffeine. Advised patient to drink plenty of water and not to lay down after eating. Counselled that medication should be taken first thing in AM 30-60 minutes before any meals with water only. Recommended to taper down PPI and use Famotidine 20mg po qhs prn.  3. Skin Cancer new lesion on forehead and rt UE should be seen by derm. Made referral to Dermatology. -counselled on sunscreen use discussed last visit.   4. Obesity class 2 Encouraged healthy diet and regular exercise.  5. b/l LE heavy, cramp seen by vascular 7/18/23 Dr. Sandhu : DVT negative, Mild venous reflux, b/l baker cyst noted.  Pt asks for Doppler test again today for left leg swelling: negative for Jose test.  Encouraged wear compression stockings and elevate legs when rests.   -check lab as planned. -F/up in 3months for BP or prn.

## 2024-07-28 NOTE — INTERPRETER SERVICES
[Patient Declined  Services] : - None: Patient declined  services [Interpreters_Relationshiptopatient] : Son [TWNoteComboBox1] : [place]

## 2024-07-28 NOTE — COUNSELING
[Fall prevention counseling provided] : Fall prevention counseling provided [Potential consequences of obesity discussed] : Potential consequences of obesity discussed [Benefits of weight loss discussed] : Benefits of weight loss discussed [Encouraged to maintain food diary] : Encouraged to maintain food diary [Encouraged to increase physical activity] : Encouraged to increase physical activity [Encouraged to use exercise tracking device] : Encouraged to use exercise tracking device [Weigh Self Weekly] : weigh self weekly

## 2024-07-28 NOTE — PHYSICAL EXAM
[Normal TMs] : both tympanic membranes were normal [No Edema] : there was no peripheral edema [Soft] : abdomen soft [Non Tender] : non-tender [Non-distended] : non-distended [No Masses] : no abdominal mass palpated [Normal Supraclavicular Nodes] : no supraclavicular lymphadenopathy [Coordination Grossly Intact] : coordination grossly intact [Normal] : affect was normal and insight and judgment were intact [de-identified] : left leg Jose sign negative. b/l LE mild varicose veins.  [de-identified] : multiple skin lesions on face, scalp, back

## 2024-07-31 ENCOUNTER — RX CHANGE (OUTPATIENT)
Age: 84
End: 2024-07-31

## 2024-07-31 LAB
25(OH)D3 SERPL-MCNC: 39.7 NG/ML
ALBUMIN SERPL ELPH-MCNC: 4.5 G/DL
ALP BLD-CCNC: 108 U/L
ALT SERPL-CCNC: 12 U/L
ANION GAP SERPL CALC-SCNC: 12 MMOL/L
AST SERPL-CCNC: 21 U/L
BASOPHILS # BLD AUTO: 0.03 K/UL
BASOPHILS NFR BLD AUTO: 0.4 %
BILIRUB SERPL-MCNC: 0.3 MG/DL
BUN SERPL-MCNC: 21 MG/DL
CALCIUM SERPL-MCNC: 9.6 MG/DL
CHLORIDE SERPL-SCNC: 102 MMOL/L
CHOLEST SERPL-MCNC: 263 MG/DL
CO2 SERPL-SCNC: 25 MMOL/L
CREAT SERPL-MCNC: 0.92 MG/DL
CREAT SPEC-SCNC: 40 MG/DL
EGFR: 62 ML/MIN/1.73M2
EOSINOPHIL # BLD AUTO: 0.16 K/UL
EOSINOPHIL NFR BLD AUTO: 2.1 %
ESTIMATED AVERAGE GLUCOSE: 126 MG/DL
GLUCOSE SERPL-MCNC: 97 MG/DL
HBA1C MFR BLD HPLC: 6 %
HCT VFR BLD CALC: 43.4 %
HDLC SERPL-MCNC: 96 MG/DL
HGB BLD-MCNC: 13.8 G/DL
IMM GRANULOCYTES NFR BLD AUTO: 0.3 %
LDLC SERPL CALC-MCNC: 155 MG/DL
LYMPHOCYTES # BLD AUTO: 2.95 K/UL
LYMPHOCYTES NFR BLD AUTO: 38.2 %
MAN DIFF?: NORMAL
MCHC RBC-ENTMCNC: 30.6 PG
MCHC RBC-ENTMCNC: 31.8 GM/DL
MCV RBC AUTO: 96.2 FL
MICROALBUMIN 24H UR DL<=1MG/L-MCNC: <1.2 MG/DL
MICROALBUMIN/CREAT 24H UR-RTO: NORMAL MG/G
MONOCYTES # BLD AUTO: 0.86 K/UL
MONOCYTES NFR BLD AUTO: 11.1 %
NEUTROPHILS # BLD AUTO: 3.7 K/UL
NEUTROPHILS NFR BLD AUTO: 47.9 %
NONHDLC SERPL-MCNC: 167 MG/DL
PLATELET # BLD AUTO: 192 K/UL
POTASSIUM SERPL-SCNC: 4.4 MMOL/L
PROT SERPL-MCNC: 7.3 G/DL
RBC # BLD: 4.51 M/UL
RBC # FLD: 13.4 %
SODIUM SERPL-SCNC: 140 MMOL/L
T4 FREE SERPL-MCNC: 1.4 NG/DL
TRIGL SERPL-MCNC: 74 MG/DL
TSH SERPL-ACNC: 3.51 UIU/ML
WBC # FLD AUTO: 7.72 K/UL

## 2024-07-31 RX ORDER — DICLOFENAC POTASSIUM 25 MG/1
25 TABLET, COATED ORAL
Qty: 14 | Refills: 1 | Status: ACTIVE | COMMUNITY
Start: 2024-07-24 | End: 1900-01-01

## 2024-08-02 ENCOUNTER — APPOINTMENT (OUTPATIENT)
Dept: ULTRASOUND IMAGING | Facility: CLINIC | Age: 84
End: 2024-08-02

## 2024-08-02 ENCOUNTER — TRANSCRIPTION ENCOUNTER (OUTPATIENT)
Age: 84
End: 2024-08-02

## 2024-08-02 PROCEDURE — 93970 EXTREMITY STUDY: CPT
